# Patient Record
Sex: FEMALE | Race: WHITE
[De-identification: names, ages, dates, MRNs, and addresses within clinical notes are randomized per-mention and may not be internally consistent; named-entity substitution may affect disease eponyms.]

---

## 2020-02-18 ENCOUNTER — HOSPITAL ENCOUNTER (OUTPATIENT)
Dept: HOSPITAL 92 - LABBT | Age: 60
Discharge: HOME | End: 2020-02-18
Attending: UROLOGY
Payer: COMMERCIAL

## 2020-02-18 DIAGNOSIS — N20.0: ICD-10-CM

## 2020-02-18 DIAGNOSIS — Z01.812: Primary | ICD-10-CM

## 2020-02-18 LAB
ANION GAP SERPL CALC-SCNC: 10 MMOL/L (ref 10–20)
APTT PPP: 30.9 SEC (ref 22.9–36.1)
BUN SERPL-MCNC: 14 MG/DL (ref 9.8–20.1)
CALCIUM SERPL-MCNC: 9.3 MG/DL (ref 7.8–10.44)
CHLORIDE SERPL-SCNC: 108 MMOL/L (ref 98–107)
CO2 SERPL-SCNC: 27 MMOL/L (ref 22–29)
CREAT CL PREDICTED SERPL C-G-VRATE: 0 ML/MIN (ref 70–130)
GLUCOSE SERPL-MCNC: 84 MG/DL (ref 70–105)
HGB BLD-MCNC: 12 G/DL (ref 12–16)
INR PPP: 1
MCH RBC QN AUTO: 30.4 PG (ref 27–31)
MCV RBC AUTO: 91 FL (ref 78–98)
PLATELET # BLD AUTO: 291 THOU/UL (ref 130–400)
POTASSIUM SERPL-SCNC: 4.4 MMOL/L (ref 3.5–5.1)
PROTHROMBIN TIME: 12.9 SEC (ref 12–14.7)
RBC # BLD AUTO: 3.96 MILL/UL (ref 4.2–5.4)
SODIUM SERPL-SCNC: 141 MMOL/L (ref 136–145)
WBC # BLD AUTO: 8.1 THOU/UL (ref 4.8–10.8)

## 2020-02-18 PROCEDURE — 85610 PROTHROMBIN TIME: CPT

## 2020-02-18 PROCEDURE — 81001 URINALYSIS AUTO W/SCOPE: CPT

## 2020-02-18 PROCEDURE — 85027 COMPLETE CBC AUTOMATED: CPT

## 2020-02-18 PROCEDURE — 85730 THROMBOPLASTIN TIME PARTIAL: CPT

## 2020-02-18 PROCEDURE — 87086 URINE CULTURE/COLONY COUNT: CPT

## 2020-02-18 PROCEDURE — 80048 BASIC METABOLIC PNL TOTAL CA: CPT

## 2020-02-19 ENCOUNTER — HOSPITAL ENCOUNTER (OUTPATIENT)
Dept: HOSPITAL 92 - SDC | Age: 60
Discharge: HOME | End: 2020-02-19
Attending: UROLOGY
Payer: COMMERCIAL

## 2020-02-19 VITALS — BODY MASS INDEX: 23.8 KG/M2

## 2020-02-19 DIAGNOSIS — N13.2: Primary | ICD-10-CM

## 2020-02-19 DIAGNOSIS — R35.0: ICD-10-CM

## 2020-02-19 DIAGNOSIS — Z79.899: ICD-10-CM

## 2020-02-19 PROCEDURE — 74018 RADEX ABDOMEN 1 VIEW: CPT

## 2020-02-19 PROCEDURE — 0T768DZ DILATION OF RIGHT URETER WITH INTRALUMINAL DEVICE, VIA NATURAL OR ARTIFICIAL OPENING ENDOSCOPIC: ICD-10-PCS | Performed by: UROLOGY

## 2020-02-19 PROCEDURE — 0TF68ZZ FRAGMENTATION IN RIGHT URETER, VIA NATURAL OR ARTIFICIAL OPENING ENDOSCOPIC: ICD-10-PCS | Performed by: UROLOGY

## 2020-02-19 PROCEDURE — C1758 CATHETER, URETERAL: HCPCS

## 2020-02-19 PROCEDURE — C1769 GUIDE WIRE: HCPCS

## 2020-02-19 PROCEDURE — 74420 UROGRAPHY RTRGR +-KUB: CPT

## 2020-02-19 NOTE — OP
DATE OF PROCEDURE:  02/19/2020



PREOPERATIVE DIAGNOSES:  A 59-year-old female with:

1. History of recurrent kidney stone, right proximal ureteral calculi 6 mm at 
the

level of L2-L3. 

2. Left punctate renal calculi.

3. History of recurrent kidney stone.



POSTOPERATIVE DIAGNOSES:  A 59-year-old female with:

1. History of recurrent kidney stone, right proximal ureteral calculi 6 mm at 
the

level of L2-L3. 

2. Left punctate renal calculi.

3. History of recurrent kidney stone.



PROCEDURES PERFORMED:  

1. Cystoscopy.

2. Right retrograde pyelogram.

3. Balloon dilatation of right distal ureter.

4. Flexible ureteroscopy, pyeloscopy, and laser lithotripsy of right proximal

ureteral calculi. 

5. Diagnostic rigid ureteroscopy.

6. A 6 x 26 double-J ureteral stent with distal tail in situ retrograde 
pyelogram.



ANESTHESIA:  LMA general.



COMPLICATIONS:  None apparent.



DISPOSITION:  To recovery room in stable condition.



INTRAOPERATIVE FINDINGS:  Right proximal ureteral calculi, obstructing, 
impacted.



INDICATIONS FOR PROCEDURE AND HISTORY:  Ms. Velasquez is a pleasant 59-year-old 
female,

visiting her daughter at [facility], recently moved from [place] to [place], 
and

does not have a urologist.  She is known to have recurrent kidney stones and

presented with right proximal ureteral calculi with hydronephrosis.  She has 
been on

Keflex and her recent urine culture with me is negative and presents for

ureteroscopy and laser lithotripsy.  Risks and complications of the procedure 
were

reviewed with her in detail, including, but not limited to:  Bleeding, pain,

infection, injury, injury to adjacent organs, possible ureteral, renal, and 
kidney

injury, stricture formation, secondary procedure, sepsis, was reviewed with the

patient in detail.  Options of observation also reviewed.  She desired to 
proceed. 



DESCRIPTION OF PROCEDURE:  After an informed consent was signed, the patient was

taken to the operating room and placed in a dorsal lithotomy position with the

genital area prepped and draped in the usual surgical sterile fashion.  A 21-
Urdu

cystoscope was utilized for cystoscopy, which demonstrated normal bladder 
mucosa.

The UOs were normal in anatomical location.  A 5-Urdu open-ended catheter was

utilized for retrograde pyelogram with 1:1 diluted contrast, demonstrating

hydronephrosis with an obstructing ureteral calculi in the proximal ureter at 
the

level of L2-L3.  This was seen as a filling defect.  Stone seen on KUB .  A

0.035 Sensor wire was able to be passed into the right upper pole; however, we 
had

difficulty passing a second safety wire due to obstructing ureteral stone.  A

10-Urdu dual-lumen access sheath was passed to the level of the stone and we

gently negotiated a 2nd Sensor wire as a rigid Superstiff wire would not go up 
with

a floppy tip.  We were able to pass a 0.035 Sensor wire, and we subsequently

transitioned this with an open-ended catheter to a Superstiff wire for working

element.  With the Sensor wire safety secured, we passed a flexible 
ureteroscope to

the level of the stone under guidewire assist.  The stone was visualized in the

proximal ureter, it appeared to be adherent to the mucosa, consistent with 
impacted

stone.  Using 200 micron laser fiber, we laser lithotripsied the stone off the

mucosa, which was challenging given its location.  We lasered the stone off the

mucosa gently, and using energy of 0.6 joules, fragmented the stone debris.  As 
they

were fragmented into smaller debris, they did migrate up into the kidney.  Her 
renal

pelvis was capacious as she has hydronephrosis.  I surveyed the collecting 
system,

which demonstrated some small punctate stone debris residual.  As she does not 
have

an access sheet, with a small caliber ureter that was not stented, I did not 
basket

extract nor was there any obvious stone that required to be retrieved as they 
were

quite small.  I surveyed the ureter again, which demonstrated the area of the 
stone,

again demonstrating mucosal edema and changes consistent with an impacted 
ureteral

calculi.  I surveyed the ureter and there was small fragmented debris in the

proximal ureter.  We surveyed the rest of the collecting system and I did not 
see

any stone nidus.  I did restage her ureter with the rigid scope as basket would 
pass

more easily.  With the rigid ureteroscope, I was able to subsequently pas to the

level of the proximal ureter and I did not see any obvious stone debris that was

concerning.  At this time, we placed a 6 x 26 double-J ureteral stent with 
distal

tail in situ and all safety wire and working wire were removed.  Good coil was 
seen

in the kidney and bladder.  She was discharged with Omnicef 300 mg one p.o. 
b.i.d.

for 10 days that she previously had low count Enterococcus with subsequent urine

negative, Colace #30, tramadol 50 #40 q.6 h. p.r.n., azo p.r.n., Ditropan 10 mg 
one

p.o. daily.  She is to obtain a KUB day before appointment on 02/26.  If no 
gross

stone debris in the ureter, I will remove her stent subsequently on 02/27 in the

morning.  Appointment provided. 







Job ID:  [id number]



MTDD

## 2020-02-19 NOTE — RAD
KUB:

 

Date:  02/19/2020

 

HISTORY:  

Right renal calculus. 

 

COMPARISON:  

None. 

 

FINDINGS:

A calcification is seen overlying the lower pole of the right kidney measuring 6-7 mm in size. No ure
teral calculi. 

 

IMPRESSION: 

Lower pole right renal calculus. 

 

 

POS: [facility]

## 2020-02-19 NOTE — RAD
RETROGRADE PYELOGRAM TWO VIEWS:

2/19/20

 

HISTORY:  

Stent placement. 

 

Two C-arm views show filling of a nondilated ureter. There appears to be a filling defect within the 
proximal ureter which corresponds in location to a calculus that was felt to be in the lower pole reg
ion of the right kidney but the ureter deviates laterally at this level and this does appear to be wi
thin the ureter. A second film shows a wire in place but no film with stent is shown. 

 

IMPRESSION: 

Proximal ureteral calculus. 

 

POS: [facility]

## 2020-02-27 ENCOUNTER — HOSPITAL ENCOUNTER (OUTPATIENT)
Dept: HOSPITAL 92 - LABBT | Age: 60
Discharge: HOME | End: 2020-02-27
Attending: UROLOGY
Payer: COMMERCIAL

## 2020-02-27 ENCOUNTER — HOSPITAL ENCOUNTER (OUTPATIENT)
Dept: HOSPITAL 92 - RAD | Age: 60
Discharge: HOME | End: 2020-02-27
Attending: UROLOGY
Payer: COMMERCIAL

## 2020-02-27 DIAGNOSIS — N20.2: ICD-10-CM

## 2020-02-27 DIAGNOSIS — Z01.812: Primary | ICD-10-CM

## 2020-02-27 DIAGNOSIS — Z96.0: ICD-10-CM

## 2020-02-27 DIAGNOSIS — N20.0: Primary | ICD-10-CM

## 2020-02-27 LAB
ANION GAP SERPL CALC-SCNC: 13 MMOL/L (ref 10–20)
APTT PPP: 30.9 SEC (ref 22.9–36.1)
BUN SERPL-MCNC: 11 MG/DL (ref 9.8–20.1)
CALCIUM SERPL-MCNC: 9.5 MG/DL (ref 7.8–10.44)
CHLORIDE SERPL-SCNC: 106 MMOL/L (ref 98–107)
CO2 SERPL-SCNC: 24 MMOL/L (ref 22–29)
CREAT CL PREDICTED SERPL C-G-VRATE: 0 ML/MIN (ref 70–130)
GLUCOSE SERPL-MCNC: 88 MG/DL (ref 70–105)
HGB BLD-MCNC: 12 G/DL (ref 12–16)
INR PPP: 1
MCH RBC QN AUTO: 29 PG (ref 27–31)
MCV RBC AUTO: 90.5 FL (ref 78–98)
PLATELET # BLD AUTO: 403 THOU/UL (ref 130–400)
POTASSIUM SERPL-SCNC: 3.6 MMOL/L (ref 3.5–5.1)
PROTHROMBIN TIME: 12.8 SEC (ref 12–14.7)
RBC # BLD AUTO: 4.13 MILL/UL (ref 4.2–5.4)
SODIUM SERPL-SCNC: 139 MMOL/L (ref 136–145)
WBC # BLD AUTO: 5.8 THOU/UL (ref 4.8–10.8)

## 2020-02-27 PROCEDURE — 85730 THROMBOPLASTIN TIME PARTIAL: CPT

## 2020-02-27 PROCEDURE — 87086 URINE CULTURE/COLONY COUNT: CPT

## 2020-02-27 PROCEDURE — 85610 PROTHROMBIN TIME: CPT

## 2020-02-27 PROCEDURE — 74018 RADEX ABDOMEN 1 VIEW: CPT

## 2020-02-27 PROCEDURE — 81001 URINALYSIS AUTO W/SCOPE: CPT

## 2020-02-27 PROCEDURE — 85027 COMPLETE CBC AUTOMATED: CPT

## 2020-02-27 PROCEDURE — 80048 BASIC METABOLIC PNL TOTAL CA: CPT

## 2020-02-27 NOTE — RAD
EXAM:

XR Abdomen 1 View/KUB



PROVIDED CLINICAL HISTORY:

Kidney stones



COMPARISON:

2/19/2020



FINDINGS:

Interval placement of right ureteral stent, with proximal coil overlying expected location of the rig
ht renal pelvis and distal coil overlying expected location of the urinary bladder. Persistent

right renal pelvic/proximal ureteral calculus adjacent to the proximal coil. No additional urinary tr
act calculi are radiographically apparent. The abdominal bowel gas pattern is nonspecific.



IMPRESSION:

Interval right ureteral stent placement.



Reported By: William Burden 

Electronically Signed:  2/27/2020 7:44 AM

## 2020-03-02 ENCOUNTER — HOSPITAL ENCOUNTER (OUTPATIENT)
Dept: HOSPITAL 92 - SDC | Age: 60
Discharge: HOME | End: 2020-03-02
Attending: UROLOGY
Payer: COMMERCIAL

## 2020-03-02 VITALS — BODY MASS INDEX: 21.3 KG/M2

## 2020-03-02 DIAGNOSIS — N13.2: Primary | ICD-10-CM

## 2020-03-02 DIAGNOSIS — Z79.899: ICD-10-CM

## 2020-03-02 PROCEDURE — 88300 SURGICAL PATH GROSS: CPT

## 2020-03-02 PROCEDURE — 0T768DZ DILATION OF RIGHT URETER WITH INTRALUMINAL DEVICE, VIA NATURAL OR ARTIFICIAL OPENING ENDOSCOPIC: ICD-10-PCS | Performed by: UROLOGY

## 2020-03-02 PROCEDURE — 82365 CALCULUS SPECTROSCOPY: CPT

## 2020-03-02 PROCEDURE — C1769 GUIDE WIRE: HCPCS

## 2020-03-02 PROCEDURE — C1758 CATHETER, URETERAL: HCPCS

## 2020-03-02 PROCEDURE — 0TF38ZZ FRAGMENTATION IN RIGHT KIDNEY PELVIS, VIA NATURAL OR ARTIFICIAL OPENING ENDOSCOPIC: ICD-10-PCS | Performed by: UROLOGY

## 2020-03-02 PROCEDURE — 74420 UROGRAPHY RTRGR +-KUB: CPT

## 2020-03-02 PROCEDURE — 74018 RADEX ABDOMEN 1 VIEW: CPT

## 2020-03-02 NOTE — RAD
EXAM:

XR IVP Retrograde



PROVIDED CLINICAL HISTORY:

Renal stone



COMPARISON:

3/2/2020 8:24 AM KUB



FINDINGS:

Multiple spot fluoroscopic images of the abdomen demonstrate initial redemonstration of right uretera
l stent and right sided adjacent calculus. Subsequent images demonstrate removal of the ureteral

stent with opacification of nondilated right renal collecting system with subsequent right ureteral s
tent replacement. On the last submitted image, previously described calculus is no longer evident.



IMPRESSION:

As above.



Reported By: William Burden 

Electronically Signed:  3/2/2020 9:51 AM

## 2020-03-02 NOTE — RAD
KUB:

3/2/2020



COMPARISON: 2/27/2020



HISTORY: Preoperative patient



FINDINGS: Stable right double-J ureteral stent. Calcification noted adjacent to the proximal curl of 
the right double-J ureteral stent measuring 4-5 mm suggesting a right renal calculus. The bowel gas

pattern appears nonobstructed.



IMPRESSION: Stable KUB as detailed above.



Reported By: Dmitry Song 

Electronically Signed:  3/2/2020 7:35 AM

## 2020-03-02 NOTE — OP
DATE OF PROCEDURE:  03/02/2020



PREOPERATIVE DIAGNOSES:  

1. A 60-year-old female with history of recurrent kidney stone, right flank pain due

to proximal ureteral calculi, status post stent ureteroscopy, with residual stone

nidus adjacent to proximal coil of the stent. 

2. Left punctate nonobstructing renal calculi.



POSTOPERATIVE DIAGNOSES:  

1. A 60-year-old female with history of recurrent kidney stone, right flank pain due

to proximal ureteral calculi, status post stent ureteroscopy, with residual stone

nidus adjacent to proximal coil of the stent. 

2. Left punctate nonobstructing renal calculi.



PROCEDURES PERFORMED:  Cystoscopy, right retrograde, 6 x 26 double-J ureteral stent

exchange with Dangler tape to pubis, ureteroscopy, pyeloscopy, laser lithotripsy,

basket extraction of stone. 



ANESTHESIA:  LMA.



COMPLICATIONS:  None apparent.



DISPOSITION:  To recovery room in stable condition.



INDICATIONS FOR PROCEDURE AND HISTORY:  Ms. Velasquez is a 60-year-old female, who

presented to the emergency room due to right flank pain.  CT demonstrated

nonobstructing left punctate renal calculi, right hydronephrosis due to proximal

ureteral calculi.  She did undergo ureteroscopy, laser lithotripsy, however, I was

unable to pass an access sheath.  Therefore, we laser lithotripsied the stone, there

was some edema at the UPJ, hindering some access into the collecting system.  A

followup KUB demonstrated persistent stone nidus adjacent to the proximal coil.

Therefore, she was advised regarding staged ureteroscopy, laser lithotripsy.

Options of stent pull in observation also discussed.  I advised the patient to

proceed with definitive surgery as I cannot rule out stone nidus that may cause more

issues, i.e., obstructing pain.  Risks and complications of the procedure were

reviewed with her in detail including, but not limited to, bleeding, pain,

infection, injury to adjacent organs, urosepsis, stricture formation, possible

secondary procedure.  Questions answered to her satisfaction and she desired to

proceed. 



DESCRIPTION OF PROCEDURE:  After an informed consent was signed, the patient was

taken to the operating room, placed in the dorsal lithotomy position with the

genital area prepped and draped in the usual surgical sterile fashion.  A 21-Bruneian

cystoscope was utilized for cystoscopy, which demonstrated normal bladder mucosa.

The previous ureteral stent was removed to the level of the meatus and a 0.035

Sensor wire was placed into the right upper pole.  A retrograde pyelogram was

performed, which demonstrated resolution of corkscrewing at the proximal UPJ

junction from previous obstructing stone.  Stone was seen in the renal pelvic

region.  A 10-Bruneian dual-lumen access was able to be passed into the proximal

ureter with ease and a second safety wire of 0.35 Super Stiff wire was placed.

Using a 28 cm, 10 to 12-Bruneian navigator, we passed a navigator with ease to the

level of the proximal ureter and a flexible ureteroscope was advanced to the renal

pelvis over the working wire.  With the working wire, we surveyed the collecting

system.  The stone was easily visualized in the right renal pelvis.  Using laser

lithotripsy, we fragmented this into a small stone debris and was able to basket the

stone intact with no ureteral trauma of concern.  Stone was sent for chemical

analysis.  I again resurveyed the collecting system, which demonstrated no evidence

of further stone nidus of concern.  The ureter was surveyed, which demonstrated no

evidence of ureteral mucosa trauma, perforation, or stone nidus.  Navigator was then

removed and a 6 x 26 double-J ureteral stent was passed without difficulty over the

safety wire.  Subsequently, the safe wire was removed and the stent was left in situ

on Dangler.  Bladder was emptied and the Dangler taped to patient's pubic symphysis. 



She is discharged with Omnicef 300 mg one p.o. daily for 7 days, Colace p.r.n., Azo

p.r.n., tramadol #30 p.r.n., and oxybutynin for bladder spasm.  She may be traveling

out of the area, in which if she is out of area, we will instruct her regarding

instructions regarding stent pull on Dangler versus a nurse visit with me on March 9th for stent pull on Dangler.  Subsequent followup be about 3 to 4 months with

followup imaging of KUB, renal ultrasound, stone metabolic panel. 







Job ID:  [id number]